# Patient Record
Sex: MALE | Race: OTHER | HISPANIC OR LATINO | Employment: STUDENT | URBAN - METROPOLITAN AREA
[De-identification: names, ages, dates, MRNs, and addresses within clinical notes are randomized per-mention and may not be internally consistent; named-entity substitution may affect disease eponyms.]

---
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
## 2025-05-15 ENCOUNTER — OFFICE VISIT (OUTPATIENT)
Dept: FAMILY MEDICINE CLINIC | Facility: CLINIC | Age: 20
End: 2025-05-15
Payer: COMMERCIAL

## 2025-05-15 VITALS
SYSTOLIC BLOOD PRESSURE: 122 MMHG | HEIGHT: 72 IN | DIASTOLIC BLOOD PRESSURE: 76 MMHG | OXYGEN SATURATION: 95 % | WEIGHT: 163 LBS | BODY MASS INDEX: 22.08 KG/M2 | HEART RATE: 84 BPM | TEMPERATURE: 99.1 F

## 2025-05-15 DIAGNOSIS — E55.9 VITAMIN D DEFICIENCY: ICD-10-CM

## 2025-05-15 DIAGNOSIS — Z13.0 SCREENING FOR DEFICIENCY ANEMIA: ICD-10-CM

## 2025-05-15 DIAGNOSIS — Z13.1 SCREENING FOR DIABETES MELLITUS: ICD-10-CM

## 2025-05-15 DIAGNOSIS — R45.89 ANXIETY ABOUT HEALTH: Primary | ICD-10-CM

## 2025-05-15 DIAGNOSIS — R53.83 FATIGUE, UNSPECIFIED TYPE: ICD-10-CM

## 2025-05-15 DIAGNOSIS — Z13.6 SCREENING FOR CARDIOVASCULAR CONDITION: ICD-10-CM

## 2025-05-15 DIAGNOSIS — Z13.29 SCREENING FOR THYROID DISORDER: ICD-10-CM

## 2025-05-15 PROBLEM — Z00.00 ANNUAL PHYSICAL EXAM: Status: RESOLVED | Noted: 2023-12-07 | Resolved: 2025-05-15

## 2025-05-15 PROCEDURE — 99214 OFFICE O/P EST MOD 30 MIN: CPT

## 2025-05-15 RX ORDER — ESCITALOPRAM OXALATE 5 MG/1
5 TABLET ORAL DAILY
Qty: 30 TABLET | Refills: 1 | Status: SHIPPED | OUTPATIENT
Start: 2025-05-15

## 2025-05-15 NOTE — PROGRESS NOTES
Name: Kun Ewing      : 2005      MRN: [id number]  Encounter Provider: Shayla Ardon PA-C  Encounter Date: 5/15/2025   Encounter department: [facility]  :  Assessment & Plan  Anxiety about health  Patient and I discussed his symptoms at length today including headaches, chest pain and vision disturbances.  After review of his chart, it appears he has seen a cardiologist in the past who cleared him from a cardiac perspective for similar chest pain.  He also has headaches about once a week and reports they are easily treatable with over-the-counter medications and does not present with any aura symptoms, nausea or vomiting.  His visual disturbances are not related to headaches and may be secondary to excessive screen time or lack of sleep hygiene.     After further discussion today with mother, it appears patient is having several symptoms as a result of his anxiety which has been discussed with him in the past as well.  At this time he is open to trying an anxiety medication which his mother is agreeable with on today's examination.  She reports she has done well with Lexapro, so we will start similar treatment for him.  Recommended starting Lexapro 5 mg daily and will have patient follow-up in about 1 month to determine success of treatment.  If unsuccessful, can consider increasing to 10 mg daily.  He also may do well with counseling and therapy which we will discuss at his next follow-up appointment.  Patient was counseled on dosing instructions of Lexapro as well as possible side effects which he understood.  He will reach out with any concerns prior to his 1 month follow-up.    We will also pursue lab workup to rule out other causes of anxiety including thyroid disorder.  Patient also has excessive fatigue which is likely secondary to anxiety but we will rule out other causes with B12 and vitamin D level.  Patient also due for CBC, CMP, lipid panel.  Orders:  •   escitalopram (LEXAPRO) 5 mg tablet; Take 1 tablet (5 mg total) by mouth daily    Vitamin D deficiency    Orders:  •  Vitamin D 25 hydroxy; Future    Fatigue, unspecified type    Orders:  •  Vitamin B12; Future    Screening for deficiency anemia    Orders:  •  CBC and differential; Future    Screening for diabetes mellitus    Orders:  •  Comprehensive metabolic panel; Future    Screening for thyroid disorder    Orders:  •  TSH, 3rd generation with Free T4 reflex; Future    Screening for cardiovascular condition    Orders:  •  Lipid Panel with Direct LDL reflex; Future           History of Present Illness   Patient seen today with his mother for evaluation of anxiety.  He reports significant health anxiety and reports that he often times have symptoms including chest pain, headaches and abnormal vision which happens out of nowhere for him.  He reports typically googling his symptoms and symptoms start to worsen when he reads that it can be very serious conditions.  He has been worked up by a cardiologist in the past as well as previous primary care providers.  Cardiology did not find any major findings with his testing thus far.  He was seen for similar concerns of chest pain back in November but failed to complete echo or stress test.  He reports headaches occur less than once per week typically and are treated with Tylenol which typically works well.  He reports abnormal vision at times, reports he is still able to see everything as he should but reports things look different than typical.  Denies blindness or floaters.  He is unable to tell me if this happens at the same time as his headaches.      Review of Systems   Constitutional:  Positive for fatigue. Negative for chills and fever.   Eyes:  Positive for visual disturbance.   Respiratory:  Negative for cough, chest tightness and shortness of breath.    Cardiovascular:  Positive for chest pain. Negative for palpitations and leg swelling.   Neurological:   "Positive for headaches. Negative for dizziness and light-headedness.       Objective   /76 (BP Location: Left arm, Patient Position: Sitting, Cuff Size: Standard)   Pulse 84   Temp 99.1 °F (37.3 °C)   Ht 5' 11.5\" (1.816 m)   Wt 73.9 kg (163 lb)   SpO2 95%   BMI 22.42 kg/m²      Physical Exam  Vitals and nursing note reviewed.   Constitutional:       General: He is not in acute distress.     Appearance: Normal appearance. He is normal weight. He is not ill-appearing.   HENT:      Head: Normocephalic and atraumatic.     Cardiovascular:      Rate and Rhythm: Normal rate and regular rhythm.      Pulses: Normal pulses.      Heart sounds: No murmur heard.  Pulmonary:      Effort: Pulmonary effort is normal. No respiratory distress.      Breath sounds: Normal breath sounds.     Neurological:      General: No focal deficit present.      Mental Status: He is alert and oriented to person, place, and time. Mental status is at baseline.     Psychiatric:         Mood and Affect: Mood normal.         Behavior: Behavior normal.         Judgment: Judgment normal.         "

## 2025-07-22 DIAGNOSIS — R45.89 ANXIETY ABOUT HEALTH: ICD-10-CM

## 2025-07-23 RX ORDER — ESCITALOPRAM OXALATE 5 MG/1
5 TABLET ORAL DAILY
Qty: 30 TABLET | Refills: 1 | Status: SHIPPED | OUTPATIENT
Start: 2025-07-23